# Patient Record
Sex: MALE | Race: WHITE | NOT HISPANIC OR LATINO | Employment: PART TIME | URBAN - METROPOLITAN AREA
[De-identification: names, ages, dates, MRNs, and addresses within clinical notes are randomized per-mention and may not be internally consistent; named-entity substitution may affect disease eponyms.]

---
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
## 2023-01-28 ENCOUNTER — OFFICE VISIT (OUTPATIENT)
Dept: URGENT CARE | Facility: URGENT CARE | Age: 18
End: 2023-01-28
Payer: COMMERCIAL

## 2023-01-28 VITALS
RESPIRATION RATE: 14 BRPM | DIASTOLIC BLOOD PRESSURE: 64 MMHG | SYSTOLIC BLOOD PRESSURE: 118 MMHG | TEMPERATURE: 97.9 F | HEART RATE: 72 BPM | WEIGHT: 158.9 LBS | BODY MASS INDEX: 23.98 KG/M2 | OXYGEN SATURATION: 99 %

## 2023-01-28 DIAGNOSIS — L02.31 ABSCESS, GLUTEAL, RIGHT: Primary | ICD-10-CM

## 2023-01-28 PROCEDURE — 87070 CULTURE OTHR SPECIMN AEROBIC: CPT | Performed by: FAMILY MEDICINE

## 2023-01-28 PROCEDURE — 87186 SC STD MICRODIL/AGAR DIL: CPT | Performed by: FAMILY MEDICINE

## 2023-01-28 PROCEDURE — 99213 OFFICE O/P EST LOW 20 MIN: CPT | Performed by: FAMILY MEDICINE

## 2023-01-28 PROCEDURE — 87077 CULTURE AEROBIC IDENTIFY: CPT | Performed by: FAMILY MEDICINE

## 2023-01-28 RX ORDER — CEPHALEXIN 500 MG/1
500 CAPSULE ORAL 3 TIMES DAILY
Qty: 30 CAPSULE | Refills: 0 | Status: SHIPPED | OUTPATIENT
Start: 2023-01-28 | End: 2023-02-07

## 2023-01-28 NOTE — PATIENT INSTRUCTIONS
Bath once daily with epsom salts  Follow up if not improving as anticipated.   Acetaminophen (Tylenol) 500mg tablets.  You may take 2 tabs every 4-6 hours as needed  Ibuprofen (Advil, Motrin) 200mg tablets.  You may take 3 tabs every 6-8 hours as needed with food.

## 2023-01-28 NOTE — PROGRESS NOTES
Clinical Decision Making:    At the end of the encounter, I discussed results, diagnosis, medications. Discussed red flags for immediate return to clinic/ER, as well as indications for follow up if no improvement. Patient understood and agreed to plan. Patient was stable for discharge.      ICD-10-CM    1. Abscess, gluteal, right  L02.31 cephALEXin (KEFLEX) 500 MG capsule     Cyst Aerobic Bacterial Culture Routine        Treating with cephalexin 500 mg 3 times daily for 10 days  Bath once daily with epsom salts  Follow up if not improving as anticipated.   Acetaminophen (Tylenol) 500mg tablets.  You may take 2 tabs every 4-6 hours as needed  Ibuprofen (Advil, Motrin) 200mg tablets.  You may take 3 tabs every 6-8 hours as needed with food.      There are no Patient Instructions on file for this visit.   No follow-ups on file.      chief complaint    HPI:  Hari Ayala is a 17 year old male who presents today complaining of pain at his right buttocks for a few days.  He has had a cyst in this area before but on the left side.  He has had no fevers or chills.  He has tried ibuprofen and taking a bath and he has noted a little bit of drainage.    History obtained from mother and the patient.    Problem List:  There are no relevant problems documented for this patient.      History reviewed. No pertinent past medical history.    Social History     Tobacco Use     Smoking status: Never     Smokeless tobacco: Never   Substance Use Topics     Alcohol use: Not on file       Review of systems  negative except listed in HPI    Vitals:    01/28/23 0907   BP: 118/64   BP Location: Right arm   Pulse: 72   Resp: 14   Temp: 97.9  F (36.6  C)   TempSrc: Tympanic   SpO2: 99%   Weight: 72.1 kg (158 lb 14.4 oz)       Physical Exam  Vitals noted and within normal limits  In general he is alert, oriented, and in no acute distress  Right side of gluteal cleft with a silver dollar sized area of erythema, firmness and tenderness.   There is a small amount of purulent drainage with pressure but there is not a large area of fluctuance.  Area cleaned with Betadine swabs x3  11 blade used to make a puncture wound and a scant amount of purulent drainage is obtained.  Wound cultures obtained  No packing placed

## 2023-02-01 ENCOUNTER — TELEPHONE (OUTPATIENT)
Dept: FAMILY MEDICINE | Facility: CLINIC | Age: 18
End: 2023-02-01
Payer: COMMERCIAL

## 2023-02-01 LAB
BACTERIA FLD CULT: ABNORMAL

## 2023-02-01 NOTE — TELEPHONE ENCOUNTER
----- Message from Lou Muhammad MD sent at 2/1/2023  6:57 AM CST -----  Team - please call patient with results. Patient with bacterial growth from culture - see if improving with cephalexin, If not, I will send in alternate antibiotic.

## 2023-02-08 NOTE — TELEPHONE ENCOUNTER
I called and left another VM for patient to call back, if/when patient calls back please inquire symptoms.    Encounter closed due to multiple attempts to reach patient.

## 2023-08-27 ENCOUNTER — OFFICE VISIT (OUTPATIENT)
Dept: URGENT CARE | Facility: URGENT CARE | Age: 18
End: 2023-08-27
Payer: COMMERCIAL

## 2023-08-27 VITALS
DIASTOLIC BLOOD PRESSURE: 89 MMHG | WEIGHT: 156 LBS | RESPIRATION RATE: 18 BRPM | TEMPERATURE: 97.4 F | BODY MASS INDEX: 23.55 KG/M2 | HEART RATE: 97 BPM | SYSTOLIC BLOOD PRESSURE: 129 MMHG | OXYGEN SATURATION: 97 %

## 2023-08-27 DIAGNOSIS — L05.01 PILONIDAL CYST WITH ABSCESS: Primary | ICD-10-CM

## 2023-08-27 PROCEDURE — 87077 CULTURE AEROBIC IDENTIFY: CPT | Performed by: FAMILY MEDICINE

## 2023-08-27 PROCEDURE — 99214 OFFICE O/P EST MOD 30 MIN: CPT | Performed by: FAMILY MEDICINE

## 2023-08-27 PROCEDURE — 87186 SC STD MICRODIL/AGAR DIL: CPT | Performed by: FAMILY MEDICINE

## 2023-08-27 PROCEDURE — 87070 CULTURE OTHR SPECIMN AEROBIC: CPT | Performed by: FAMILY MEDICINE

## 2023-08-27 RX ORDER — CEPHALEXIN 500 MG/1
500 CAPSULE ORAL 3 TIMES DAILY
Qty: 30 CAPSULE | Refills: 0 | Status: SHIPPED | OUTPATIENT
Start: 2023-08-27 | End: 2023-09-06

## 2023-08-27 NOTE — PROGRESS NOTES
Clinical Decision Making:    At the end of the encounter, I discussed results, diagnosis, medications. Discussed red flags for immediate return to clinic/ER, as well as indications for follow up if no improvement. Patient understood and agreed to plan. Patient was stable for discharge.      ICD-10-CM    1. Pilonidal cyst with abscess  L05.01 Abscess Aerobic Bacterial Culture Routine     Adult General Surg Referral     cephALEXin (KEFLEX) 500 MG capsule      Pilonidal cyst with abscess that is spontaneously draining  Treating with cephalexin 500 mg 3 times daily for 10 days  Recommended soaking in the bathtub at least once daily  Referral done for general surgery for pilonidal cyst.  Patient has had 3 infections in the last 2 years and may benefit from pilonidal cyst removal.  Wound culture pending        There are no Patient Instructions on file for this visit.   No follow-ups on file.      chief complaint    HPI:  Hari Ayala is a 18 year old male who presents today complaining of pain and drainage in the buttock area.  The pain is worse if he is walking or sitting.  It has been bothering him for a couple of days.  He has had abscesses in the gluteal region a couple of times before and he feels that he has caught this 1 sooner-it feels smaller than when he has had this in the past.  No fevers.  Chart review shows that he has had the same issue November 2021 and in January 2023  History obtained from chart review and the patient.    Problem List:  There are no relevant problems documented for this patient.      History reviewed. No pertinent past medical history.    Social History     Tobacco Use    Smoking status: Never    Smokeless tobacco: Never   Substance Use Topics    Alcohol use: Not on file       Review of systems  negative except listed in HPI    Vitals:    08/27/23 0955   BP: 129/89   Pulse: 97   Resp: 18   Temp: 97.4  F (36.3  C)   TempSrc: Tympanic   SpO2: 97%   Weight: 70.8 kg (156 lb)        Physical Exam  Vitals noted and within normal limits  In general he is alert, oriented, and in no acute distress  In the gluteal cleft there is purulent drainage with pressure.  There is erythema and tenderness especially on the left gluteal area.  The left side feels firm and not fluctuant.  There is a dimple at the superior aspect of the gluteal cleft.  Pressure is applied to help drain out some more purulent fluid.  Patient tolerated well although it was tender.

## 2023-08-30 ENCOUNTER — OFFICE VISIT (OUTPATIENT)
Dept: FAMILY MEDICINE | Facility: CLINIC | Age: 18
End: 2023-08-30
Payer: COMMERCIAL

## 2023-08-30 VITALS
HEART RATE: 102 BPM | SYSTOLIC BLOOD PRESSURE: 120 MMHG | OXYGEN SATURATION: 99 % | DIASTOLIC BLOOD PRESSURE: 74 MMHG | RESPIRATION RATE: 16 BRPM | TEMPERATURE: 99.2 F

## 2023-08-30 DIAGNOSIS — L02.31 ABSCESS AND CELLULITIS OF GLUTEAL REGION: Primary | ICD-10-CM

## 2023-08-30 DIAGNOSIS — L03.317 ABSCESS AND CELLULITIS OF GLUTEAL REGION: Primary | ICD-10-CM

## 2023-08-30 PROCEDURE — 99213 OFFICE O/P EST LOW 20 MIN: CPT | Mod: 25 | Performed by: PHYSICIAN ASSISTANT

## 2023-08-30 PROCEDURE — 10060 I&D ABSCESS SIMPLE/SINGLE: CPT | Performed by: PHYSICIAN ASSISTANT

## 2023-08-30 NOTE — PROGRESS NOTES
Assessment & Plan     Abscess and cellulitis of gluteal region  Pt is having increased size of abscess despite some drainage and abx.  Recommended I and D of the abscess. . Pt agreeable with plan. Consent obtained.    Area was prepped and draped in usual sterile fashion.  5 ml of 1%lidocaine with epi was used to anesthatize the area.  After adequate anesthesia, 11 blade scalple used to making a 1 cm incision. Moderate purulent discharge easily expressed.  Wound irrigated with sterile water.  Packed with 1/4 inch plain gauze.    Pt tolerated well. Light gauze dressing.   Continue with keflex awaiting culture.   Follow-up 2 days for packing removal, consider repacking.        Britney Toro PA-C  [facility]    Renan Noriega is a 18 year old male who presents to clinic today for the following health issues:  Chief Complaint   Patient presents with    pilonidal cyst recheck     Pt feels better in general but feels it has grown in size. Has been draining.      History of Present Illness       Reason for visit:  Cyst growth    He eats 0-1 servings of fruits and vegetables daily.He consumes 3 sweetened beverage(s) daily.He exercises with enough effort to increase his heart rate 9 or less minutes per day.  He exercises with enough effort to increase his heart rate 3 or less days per week.   He is taking medications regularly.      Pt was seen 4 days ago for draining pilonidal cyst with abscess.  Culture pending but preliminary staph Lugdunensis. Pt is on keflex. He states pain is improving, but he is having increased size and is now extended to the L buttock of the abscess . It does continue to drain he is doing warm soaks.  No fevers.          Review of Systems  Constitutional, HEENT, cardiovascular, pulmonary, gi and gu systems are negative, except as otherwise noted.      Objective    /74 (BP Location: Right arm, Patient Position: Sitting, Cuff Size: Adult Large)   Pulse 102    Temp 99.2  F (37.3  C) (Tympanic)   Resp 16   SpO2 99%   Physical Exam   Exam of the buttock are reveals a 3 x 3 cm area of erythema, swelling, fluctuant nodule L buttock about 2 cm from the gluteal fold.  There is surrounding erythema that extends about 1-2 cm in diameter.    There is active drainage out of the proximal gluteal cleft where there are multiple pores and some previous scarring.

## 2023-08-31 ENCOUNTER — TELEPHONE (OUTPATIENT)
Dept: URGENT CARE | Facility: URGENT CARE | Age: 18
End: 2023-08-31
Payer: COMMERCIAL

## 2023-08-31 LAB
BACTERIA ABSC ANAEROBE+AEROBE CULT: ABNORMAL
BACTERIA ABSC ANAEROBE+AEROBE CULT: ABNORMAL

## 2023-08-31 NOTE — TELEPHONE ENCOUNTER
----- Message from Lorene Schrader MD sent at 8/31/2023  9:34 AM CDT -----  Please call patient and let him know that the bacterial culture show that the cephalexin (Keflex) that he is taking will cover this infection.  Be sure to complete the full course of treatment.  Continue baths daily.  Follow up with surgical consult as we discussed in clinic and referral was placed.  Thanks!

## 2023-09-01 ENCOUNTER — TELEPHONE (OUTPATIENT)
Dept: URGENT CARE | Facility: URGENT CARE | Age: 18
End: 2023-09-01

## 2023-09-01 ENCOUNTER — OFFICE VISIT (OUTPATIENT)
Dept: FAMILY MEDICINE | Facility: CLINIC | Age: 18
End: 2023-09-01
Payer: COMMERCIAL

## 2023-09-01 VITALS
BODY MASS INDEX: 23.64 KG/M2 | HEART RATE: 78 BPM | DIASTOLIC BLOOD PRESSURE: 77 MMHG | HEIGHT: 68 IN | WEIGHT: 156 LBS | SYSTOLIC BLOOD PRESSURE: 136 MMHG

## 2023-09-01 DIAGNOSIS — L05.91 PILONIDAL CYST: Primary | ICD-10-CM

## 2023-09-01 PROCEDURE — 99024 POSTOP FOLLOW-UP VISIT: CPT | Performed by: PHYSICIAN ASSISTANT

## 2023-09-01 ASSESSMENT — ENCOUNTER SYMPTOMS
FEVER: 0
ACTIVITY CHANGE: 1
CHILLS: 0

## 2023-09-01 NOTE — RESULT ENCOUNTER NOTE
Left voicemail for patient to return call to clinic. When patient returns call, please give them below message.    Suze Joe CMA ............... 3:41 PM, 09/01/23

## 2023-09-01 NOTE — TELEPHONE ENCOUNTER
Left voicemail for patient to return call to clinic. When patient returns call, please give them below message.    Suze Joe CMA ............... 3:43 PM, 09/01/23

## 2023-09-01 NOTE — PROGRESS NOTES
"  Assessment & Plan     Pilonidal cyst  Continue soaks. Finish antibiotics. Follow-up PRN.                    MYESHA Glass  [facility]    Renan Noriega is a 18 year old, presenting for the following health issues:  RECHECK (cyst)      9/1/2023     3:23 PM   Additional Questions   Roomed by Bronwyn       Here to recheck cyst. Remove packing. Doing much better. Tolerating the antibiotics. Doing soaks. No fever or chills. Had I & D.    History of Present Illness       Reason for visit:  Cyst growth    He eats 0-1 servings of fruits and vegetables daily.He consumes 3 sweetened beverage(s) daily.He exercises with enough effort to increase his heart rate 9 or less minutes per day.  He exercises with enough effort to increase his heart rate 3 or less days per week.   He is taking medications regularly.               Review of Systems   Constitutional:  Positive for activity change. Negative for chills and fever.            Objective    /77 (BP Location: Left arm, Patient Position: Sitting, Cuff Size: Adult Regular)   Pulse 78   Ht 1.734 m (5' 8.25\")   Wt 70.8 kg (156 lb)   BMI 23.55 kg/m    Body mass index is 23.55 kg/m .  Physical Exam No erythema, minimal swelling. Packing removed by ESPERANZA TilleyS with direct observation.                        "

## 2023-11-07 DIAGNOSIS — L05.91 PILONIDAL CYST: Primary | ICD-10-CM

## 2023-11-11 ENCOUNTER — OFFICE VISIT (OUTPATIENT)
Dept: URGENT CARE | Facility: URGENT CARE | Age: 18
End: 2023-11-11
Payer: COMMERCIAL

## 2023-11-11 VITALS
RESPIRATION RATE: 16 BRPM | DIASTOLIC BLOOD PRESSURE: 64 MMHG | SYSTOLIC BLOOD PRESSURE: 109 MMHG | HEART RATE: 72 BPM | WEIGHT: 156 LBS | BODY MASS INDEX: 23.55 KG/M2 | TEMPERATURE: 98.5 F | OXYGEN SATURATION: 97 %

## 2023-11-11 DIAGNOSIS — L05.01 PILONIDAL ABSCESS: Primary | ICD-10-CM

## 2023-11-11 PROCEDURE — 10080 I&D PILONIDAL CYST SIMPLE: CPT | Performed by: PHYSICIAN ASSISTANT

## 2023-11-11 NOTE — PROGRESS NOTES
Assessment & Plan     Pilonidal abscess  Patient has a large pilonidal cyst with abscess with a fluctuant area just lateral to the draining pore.  I have recommended  incision and drainage today and patient is agreeable to this.  The area was prepped and draped in the usual sterile fashion.  4 cc of 1% lidocaine with epinephrine was used to anesthetize the area.  After adequate anesthesia a 2 cm incision was made just left lateral to the draining pore at the proximal gluteal fold and extending laterally over the area of fluctuance.  A moderate amount of bloody and purulent discharge was easily expressed.  A hemostat was used to break up any loculations.  The wound was then irrigated with sterile saline and packed with 1/2 inch sterile packing gauze.  Patient tolerated the procedure well.  A light gauze dressing was placed.  He will return in 1 or 2 days for dressing change and removal of the packing.  He will follow-up with general surgery.  Will start Augmentin, warm compresses.  Follow-up for any worsening symptoms or fevers.  - amoxicillin-clavulanate (AUGMENTIN) 875-125 MG tablet  Dispense: 20 tablet; Refill: 0  - Adult General Surg Referral       Britney Toro PA-C  [facility] [place]    Renan Noriega is a 18 year old male who presents to clinic today for the following health issues:  Chief Complaint   Patient presents with    Derm Problem     Cyst on tailbone- x 1 month aggrevated it approx 1 month go when dirt biking.  Draining   Patient has referral for general surgery     HPI    Hari is a pleasant 18-year-old male who presents to urgent care with concerns regarding draining cyst on his tailbone over the last 1 month.  He has a history of pilonidal cyst with abscess and cellulitis which was most recently drained on 8/30/2023.  He had improved from that episode however approximately 1 month ago he was dirt biking and fell landing on his buttocks with swelling pain and  ecchymosis.  He has been monitoring the area however over the last several weeks started draining on its own a bloody and purulent discharge.  He denies any fevers.  He does have a pending general surgery consult.  He has been using ibuprofen for pain control and doing some warm soaks.  His mom is an experienced clinical support staff here at the clinic and looked at the area noting a larger area of swelling that she felt likely needed drainage today.    Review of Systems  Constitutional, HEENT, cardiovascular, pulmonary, gi and gu systems are negative, except as otherwise noted.      Objective    /64   Pulse 72   Temp 98.5  F (36.9  C)   Resp 16   Wt 70.8 kg (156 lb)   SpO2 97%   BMI 23.55 kg/m    Physical Exam   Examination of the buttocks reveals actively draining pore purulent brown discharge at the proximal gluteal fold at the area of the previous incision.  Just inferior to this is some proud flesh.  There is a approximately 4 cm x 4 cm area of induration left lateral to the draining wound on the buttock proximally and mild induration but no fluctuance on the right lateral aspect of the proximal gluteal fold.  There is mild ecchymosis overlying the sacrum.

## 2023-11-13 ENCOUNTER — OFFICE VISIT (OUTPATIENT)
Dept: FAMILY MEDICINE | Facility: CLINIC | Age: 18
End: 2023-11-13
Payer: COMMERCIAL

## 2023-11-13 VITALS
WEIGHT: 160.9 LBS | TEMPERATURE: 98.3 F | RESPIRATION RATE: 16 BRPM | HEIGHT: 68 IN | OXYGEN SATURATION: 99 % | BODY MASS INDEX: 24.38 KG/M2 | DIASTOLIC BLOOD PRESSURE: 74 MMHG | SYSTOLIC BLOOD PRESSURE: 125 MMHG | HEART RATE: 77 BPM

## 2023-11-13 DIAGNOSIS — L03.317 ABSCESS AND CELLULITIS OF GLUTEAL REGION: Primary | ICD-10-CM

## 2023-11-13 DIAGNOSIS — L02.31 ABSCESS AND CELLULITIS OF GLUTEAL REGION: Primary | ICD-10-CM

## 2023-11-13 PROCEDURE — 99213 OFFICE O/P EST LOW 20 MIN: CPT | Performed by: FAMILY MEDICINE
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
## 2023-11-15 ENCOUNTER — OFFICE VISIT (OUTPATIENT)
Dept: FAMILY MEDICINE | Facility: CLINIC | Age: 18
End: 2023-11-15
Payer: COMMERCIAL

## 2023-11-15 VITALS
DIASTOLIC BLOOD PRESSURE: 78 MMHG | BODY MASS INDEX: 24.07 KG/M2 | SYSTOLIC BLOOD PRESSURE: 134 MMHG | TEMPERATURE: 98.7 F | WEIGHT: 158.8 LBS | HEART RATE: 69 BPM | RESPIRATION RATE: 16 BRPM | OXYGEN SATURATION: 98 % | HEIGHT: 68 IN

## 2023-11-15 DIAGNOSIS — L05.91 PILONIDAL CYST: Primary | ICD-10-CM

## 2023-11-15 PROCEDURE — 99213 OFFICE O/P EST LOW 20 MIN: CPT | Performed by: PHYSICIAN ASSISTANT

## 2024-06-07 ENCOUNTER — OFFICE VISIT (OUTPATIENT)
Dept: SURGERY | Facility: CLINIC | Age: 19
End: 2024-06-07
Payer: COMMERCIAL

## 2024-06-07 VITALS
BODY MASS INDEX: 24.63 KG/M2 | HEIGHT: 68 IN | DIASTOLIC BLOOD PRESSURE: 76 MMHG | SYSTOLIC BLOOD PRESSURE: 124 MMHG | WEIGHT: 162.5 LBS

## 2024-06-07 DIAGNOSIS — L05.01 PILONIDAL ABSCESS: ICD-10-CM

## 2024-06-07 PROCEDURE — 99204 OFFICE O/P NEW MOD 45 MIN: CPT | Performed by: SURGERY

## 2024-06-07 RX ORDER — ACETAMINOPHEN 325 MG/1
975 TABLET ORAL ONCE
Status: CANCELLED | OUTPATIENT
Start: 2024-06-07 | End: 2024-06-07

## 2024-06-07 NOTE — H&P (VIEW-ONLY)
"GENERAL SURGICAL CONSULTATION    I was requested by Florentin Gore to consult on this pt to evaluate them for Pilonidal Cyst    HPI:  This is a 19 year old male here today with a draining sore area over the coccyx for 3 days.  He has not had any treatment so far this time around.  He has struggled with this Veto Cyst for the last year and he has had it lanced 3 times.     Allergies:Patient has no known allergies.    No past medical history on file.    No past surgical history on file.    CURRENT MEDS:  No current outpatient medications on file.       No family history on file.  Family history is not pertinent to this patients Chief Complaint.     reports that he has never smoked. He has never been exposed to tobacco smoke. He has never used smokeless tobacco.    Review of Systems -   10 point Review of systems is negative except for; as mentioned above in HPI and PMHx    /76   Ht 1.727 m (5' 8\")   Wt 73.7 kg (162 lb 8 oz)   BMI 24.71 kg/m    Body mass index is 24.71 kg/m .    EXAM:  GENERAL: Well developed male  HEENT: EOMI, Anicteric Sclera, Moist Mucous Membranes,  In Mouth the pt does not have redness or bleeding gums  CARDIOVASCULAR: RRR w/out murmur   CHEST/LUNG: Clear to Auscultation  ABDOMEN:  Non tender to palpation, +BS  MUSCULOSKELETAL:  No deformities with good range of motion in all extremities  NEURO: He is ambulatory with good strength in both legs.  HEME/LYMPH: No Cervical Adenopathy or tenderness.     IMAGES:  None    Assessment/Plan:  Infected Pilonidal Cyst.      Open Excision  Start on Antibiotics.        Jesus Morocho MD  [facility]  [phone]   "

## 2024-06-07 NOTE — LETTER
"6/7/2024      Hari Ayala  [address] [place] [address]      Dear Colleague,    Thank you for referring your patient, Hari Ayala, to the [facility] AND [facility]IATRICS CARE [place]. Please see a copy of my visit note below.    GENERAL SURGICAL CONSULTATION    I was requested by Florentin Gore to consult on this pt to evaluate them for Pilonidal Cyst    HPI:  This is a 19 year old male here today with a draining sore area over the coccyx for 3 days.  He has not had any treatment so far this time around.  He has struggled with this Veto Cyst for the last year and he has had it lanced 3 times.     Allergies:Patient has no known allergies.    No past medical history on file.    No past surgical history on file.    CURRENT MEDS:  No current outpatient medications on file.       No family history on file.  Family history is not pertinent to this patients Chief Complaint.     reports that he has never smoked. He has never been exposed to tobacco smoke. He has never used smokeless tobacco.    Review of Systems -   10 point Review of systems is negative except for; as mentioned above in HPI and PMHx    /76   Ht 1.727 m (5' 8\")   Wt 73.7 kg (162 lb 8 oz)   BMI 24.71 kg/m    Body mass index is 24.71 kg/m .    EXAM:  GENERAL: Well developed male  HEENT: EOMI, Anicteric Sclera, Moist Mucous Membranes,  In Mouth the pt does not have redness or bleeding gums  CARDIOVASCULAR: RRR w/out murmur   CHEST/LUNG: Clear to Auscultation  ABDOMEN:  Non tender to palpation, +BS  MUSCULOSKELETAL:  No deformities with good range of motion in all extremities  NEURO: He is ambulatory with good strength in both legs.  HEME/LYMPH: No Cervical Adenopathy or tenderness.     IMAGES:  None    Assessment/Plan:  Infected Pilonidal Cyst.      Open Excision  Start on Antibiotics.        Jesus Morocho MD  [facility]  [phone]       Again, thank you for allowing me to participate in " the care of your patient.        Sincerely,        Jesus Morocho MD

## 2024-06-07 NOTE — PROGRESS NOTES
"GENERAL SURGICAL CONSULTATION    I was requested by Florentin Gore to consult on this pt to evaluate them for Pilonidal Cyst    HPI:  This is a 19 year old male here today with a draining sore area over the coccyx for 3 days.  He has not had any treatment so far this time around.  He has struggled with this Veto Cyst for the last year and he has had it lanced 3 times.     Allergies:Patient has no known allergies.    No past medical history on file.    No past surgical history on file.    CURRENT MEDS:  No current outpatient medications on file.       No family history on file.  Family history is not pertinent to this patients Chief Complaint.     reports that he has never smoked. He has never been exposed to tobacco smoke. He has never used smokeless tobacco.    Review of Systems -   10 point Review of systems is negative except for; as mentioned above in HPI and PMHx    /76   Ht 1.727 m (5' 8\")   Wt 73.7 kg (162 lb 8 oz)   BMI 24.71 kg/m    Body mass index is 24.71 kg/m .    EXAM:  GENERAL: Well developed male  HEENT: EOMI, Anicteric Sclera, Moist Mucous Membranes,  In Mouth the pt does not have redness or bleeding gums  CARDIOVASCULAR: RRR w/out murmur   CHEST/LUNG: Clear to Auscultation  ABDOMEN:  Non tender to palpation, +BS  MUSCULOSKELETAL:  No deformities with good range of motion in all extremities  NEURO: He is ambulatory with good strength in both legs.  HEME/LYMPH: No Cervical Adenopathy or tenderness.     IMAGES:  None    Assessment/Plan:  Infected Pilonidal Cyst.      Open Excision  Start on Antibiotics.        Jesus Morocho MD  [facility]  [phone]   "

## 2024-06-10 ENCOUNTER — ANESTHESIA EVENT (OUTPATIENT)
Dept: SURGERY | Facility: AMBULATORY SURGERY CENTER | Age: 19
End: 2024-06-10
Payer: COMMERCIAL

## 2024-06-10 RX ORDER — ONDANSETRON 2 MG/ML
4 INJECTION INTRAMUSCULAR; INTRAVENOUS
Status: CANCELLED | OUTPATIENT
Start: 2024-06-10

## 2024-06-10 RX ORDER — LIDOCAINE 40 MG/G
CREAM TOPICAL
Status: CANCELLED | OUTPATIENT
Start: 2024-06-10

## 2024-06-11 ENCOUNTER — HOSPITAL ENCOUNTER (OUTPATIENT)
Facility: AMBULATORY SURGERY CENTER | Age: 19
Discharge: HOME | End: 2024-06-11
Attending: SURGERY
Payer: COMMERCIAL

## 2024-06-11 ENCOUNTER — ANESTHESIA (OUTPATIENT)
Dept: SURGERY | Facility: AMBULATORY SURGERY CENTER | Age: 19
End: 2024-06-11
Payer: COMMERCIAL

## 2024-06-11 VITALS
RESPIRATION RATE: 18 BRPM | TEMPERATURE: 97.4 F | HEART RATE: 96 BPM | DIASTOLIC BLOOD PRESSURE: 50 MMHG | OXYGEN SATURATION: 98 % | SYSTOLIC BLOOD PRESSURE: 105 MMHG

## 2024-06-11 DIAGNOSIS — L05.01 PILONIDAL ABSCESS: ICD-10-CM

## 2024-06-11 PROCEDURE — 11770 EXC PILONIDAL CYST SIMPLE: CPT | Performed by: SURGERY

## 2024-06-11 RX ORDER — PROPOFOL 10 MG/ML
INJECTION, EMULSION INTRAVENOUS PRN
Status: DISCONTINUED | OUTPATIENT
Start: 2024-06-11 | End: 2024-06-11

## 2024-06-11 RX ORDER — BUPIVACAINE HYDROCHLORIDE AND EPINEPHRINE 2.5; 5 MG/ML; UG/ML
INJECTION, SOLUTION INFILTRATION; PERINEURAL PRN
Status: DISCONTINUED | OUTPATIENT
Start: 2024-06-11 | End: 2024-06-11 | Stop reason: HOSPADM

## 2024-06-11 RX ORDER — ACETAMINOPHEN 325 MG/1
975 TABLET ORAL ONCE
Status: COMPLETED | OUTPATIENT
Start: 2024-06-11 | End: 2024-06-11

## 2024-06-11 RX ORDER — CEFAZOLIN SODIUM 2 G/100ML
2 INJECTION, SOLUTION INTRAVENOUS
Status: COMPLETED | OUTPATIENT
Start: 2024-06-11 | End: 2024-06-11

## 2024-06-11 RX ORDER — CEFAZOLIN SODIUM 2 G/100ML
2 INJECTION, SOLUTION INTRAVENOUS SEE ADMIN INSTRUCTIONS
Status: DISCONTINUED | OUTPATIENT
Start: 2024-06-11 | End: 2024-06-12 | Stop reason: HOSPADM

## 2024-06-11 RX ORDER — PROPOFOL 10 MG/ML
INJECTION, EMULSION INTRAVENOUS CONTINUOUS PRN
Status: DISCONTINUED | OUTPATIENT
Start: 2024-06-11 | End: 2024-06-11

## 2024-06-11 RX ORDER — NALOXONE HYDROCHLORIDE 0.4 MG/ML
0.1 INJECTION, SOLUTION INTRAMUSCULAR; INTRAVENOUS; SUBCUTANEOUS
Status: DISCONTINUED | OUTPATIENT
Start: 2024-06-11 | End: 2024-06-12 | Stop reason: HOSPADM

## 2024-06-11 RX ORDER — DOCUSATE SODIUM 100 MG/1
100 CAPSULE, LIQUID FILLED ORAL 2 TIMES DAILY PRN
Qty: 30 CAPSULE | Refills: 0 | Status: SHIPPED | OUTPATIENT
Start: 2024-06-11 | End: 2024-09-12

## 2024-06-11 RX ORDER — DEXAMETHASONE SODIUM PHOSPHATE 4 MG/ML
4 INJECTION, SOLUTION INTRA-ARTICULAR; INTRALESIONAL; INTRAMUSCULAR; INTRAVENOUS; SOFT TISSUE
Status: DISCONTINUED | OUTPATIENT
Start: 2024-06-11 | End: 2024-06-12 | Stop reason: HOSPADM

## 2024-06-11 RX ORDER — OXYCODONE HYDROCHLORIDE 10 MG/1
10 TABLET ORAL
Status: DISCONTINUED | OUTPATIENT
Start: 2024-06-11 | End: 2024-06-12 | Stop reason: HOSPADM

## 2024-06-11 RX ORDER — IBUPROFEN 600 MG/1
600 TABLET, FILM COATED ORAL EVERY 6 HOURS PRN
Qty: 30 TABLET | Refills: 0 | Status: SHIPPED | OUTPATIENT
Start: 2024-06-11

## 2024-06-11 RX ORDER — SODIUM CHLORIDE, SODIUM LACTATE, POTASSIUM CHLORIDE, CALCIUM CHLORIDE 600; 310; 30; 20 MG/100ML; MG/100ML; MG/100ML; MG/100ML
INJECTION, SOLUTION INTRAVENOUS CONTINUOUS
Status: DISCONTINUED | OUTPATIENT
Start: 2024-06-11 | End: 2024-06-12 | Stop reason: HOSPADM

## 2024-06-11 RX ORDER — FENTANYL CITRATE 50 UG/ML
INJECTION, SOLUTION INTRAMUSCULAR; INTRAVENOUS PRN
Status: DISCONTINUED | OUTPATIENT
Start: 2024-06-11 | End: 2024-06-11

## 2024-06-11 RX ORDER — ACETAMINOPHEN 325 MG/1
650 TABLET ORAL EVERY 4 HOURS PRN
Qty: 50 TABLET | Refills: 0 | Status: SHIPPED | OUTPATIENT
Start: 2024-06-11

## 2024-06-11 RX ORDER — ACETAMINOPHEN 325 MG/1
975 TABLET ORAL ONCE
Status: DISCONTINUED | OUTPATIENT
Start: 2024-06-11 | End: 2024-06-12 | Stop reason: HOSPADM

## 2024-06-11 RX ORDER — ONDANSETRON 2 MG/ML
4 INJECTION INTRAMUSCULAR; INTRAVENOUS EVERY 30 MIN PRN
Status: DISCONTINUED | OUTPATIENT
Start: 2024-06-11 | End: 2024-06-12 | Stop reason: HOSPADM

## 2024-06-11 RX ORDER — ONDANSETRON 4 MG/1
4 TABLET, ORALLY DISINTEGRATING ORAL EVERY 30 MIN PRN
Status: DISCONTINUED | OUTPATIENT
Start: 2024-06-11 | End: 2024-06-12 | Stop reason: HOSPADM

## 2024-06-11 RX ORDER — MAGNESIUM HYDROXIDE 1200 MG/15ML
LIQUID ORAL PRN
Status: DISCONTINUED | OUTPATIENT
Start: 2024-06-11 | End: 2024-06-11 | Stop reason: HOSPADM

## 2024-06-11 RX ORDER — FENTANYL CITRATE 0.05 MG/ML
25 INJECTION, SOLUTION INTRAMUSCULAR; INTRAVENOUS
Status: DISCONTINUED | OUTPATIENT
Start: 2024-06-11 | End: 2024-06-12 | Stop reason: HOSPADM

## 2024-06-11 RX ORDER — ONDANSETRON 2 MG/ML
INJECTION INTRAMUSCULAR; INTRAVENOUS PRN
Status: DISCONTINUED | OUTPATIENT
Start: 2024-06-11 | End: 2024-06-11

## 2024-06-11 RX ORDER — HYDROCODONE BITARTRATE AND ACETAMINOPHEN 5; 325 MG/1; MG/1
1 TABLET ORAL EVERY 6 HOURS PRN
Qty: 10 TABLET | Refills: 0 | Status: SHIPPED | OUTPATIENT
Start: 2024-06-11 | End: 2024-06-14

## 2024-06-11 RX ORDER — LIDOCAINE HYDROCHLORIDE 20 MG/ML
INJECTION, SOLUTION INFILTRATION; PERINEURAL PRN
Status: DISCONTINUED | OUTPATIENT
Start: 2024-06-11 | End: 2024-06-11

## 2024-06-11 RX ORDER — LIDOCAINE 40 MG/G
CREAM TOPICAL
Status: DISCONTINUED | OUTPATIENT
Start: 2024-06-11 | End: 2024-06-12 | Stop reason: HOSPADM

## 2024-06-11 RX ORDER — OXYCODONE HYDROCHLORIDE 5 MG/1
5 TABLET ORAL
Status: DISCONTINUED | OUTPATIENT
Start: 2024-06-11 | End: 2024-06-12 | Stop reason: HOSPADM

## 2024-06-11 RX ADMIN — FENTANYL CITRATE 50 MCG: 50 INJECTION, SOLUTION INTRAMUSCULAR; INTRAVENOUS at 12:22

## 2024-06-11 RX ADMIN — CEFAZOLIN SODIUM 2 G: 2 INJECTION, SOLUTION INTRAVENOUS at 12:16

## 2024-06-11 RX ADMIN — SODIUM CHLORIDE, SODIUM LACTATE, POTASSIUM CHLORIDE, CALCIUM CHLORIDE: 600; 310; 30; 20 INJECTION, SOLUTION INTRAVENOUS at 11:39

## 2024-06-11 RX ADMIN — PROPOFOL 180 MCG/KG/MIN: 10 INJECTION, EMULSION INTRAVENOUS at 12:18

## 2024-06-11 RX ADMIN — ONDANSETRON 4 MG: 2 INJECTION INTRAMUSCULAR; INTRAVENOUS at 12:22

## 2024-06-11 RX ADMIN — LIDOCAINE HYDROCHLORIDE 25 MG: 20 INJECTION, SOLUTION INFILTRATION; PERINEURAL at 12:18

## 2024-06-11 RX ADMIN — ACETAMINOPHEN 975 MG: 325 TABLET ORAL at 11:30

## 2024-06-11 RX ADMIN — FENTANYL CITRATE 50 MCG: 50 INJECTION, SOLUTION INTRAMUSCULAR; INTRAVENOUS at 12:34

## 2024-06-11 RX ADMIN — PROPOFOL 50 MG: 10 INJECTION, EMULSION INTRAVENOUS at 12:20

## 2024-06-11 ASSESSMENT — LIFESTYLE VARIABLES: TOBACCO_USE: 0

## 2024-06-11 NOTE — ANESTHESIA PREPROCEDURE EVALUATION
"Anesthesia Pre-Procedure Evaluation    Patient: Hari Ayala   MRN: [id number] : 2005        Procedure : Procedure(s):  EXCISION, PILONIDAL CYST          History reviewed. No pertinent past medical history.   History reviewed. No pertinent surgical history.   No Known Allergies   Social History     Tobacco Use    Smoking status: Never     Passive exposure: Never    Smokeless tobacco: Never   Substance Use Topics    Alcohol use: Yes     Comment: socially      Wt Readings from Last 1 Encounters:   24 73.7 kg (162 lb 8 oz)        Anesthesia Evaluation   Pt has not had prior anesthetic         ROS/MED HX  ENT/Pulmonary:    (-) tobacco use and sleep apnea   Neurologic:  - neg neurologic ROS     Cardiovascular:  - neg cardiovascular ROS     METS/Exercise Tolerance: >4 METS    Hematologic:  - neg hematologic  ROS     Musculoskeletal:  - neg musculoskeletal ROS     GI/Hepatic:    (-) GERD   Renal/Genitourinary:  - neg Renal ROS     Endo:  - neg endo ROS     Psychiatric/Substance Use:  - neg psychiatric ROS     Infectious Disease:  - neg infectious disease ROS     Malignancy:       Other:            Physical Exam    Airway        Mallampati: IV   TM distance: > 3 FB   Neck ROM: full   Mouth opening: > 3 cm    Respiratory Devices and Support         Dental       (+) Minor Abnormalities - some fillings, tiny chips      Cardiovascular          Rhythm and rate: regular     Pulmonary           breath sounds clear to auscultation           OUTSIDE LABS:  CBC: No results found for: \"WBC\", \"HGB\", \"HCT\", \"PLT\"  BMP:   Lab Results   Component Value Date    CR 0.51 2014    GLC 78 2014     COAGS: No results found for: \"PTT\", \"INR\", \"FIBR\"  POC: No results found for: \"BGM\", \"HCG\", \"HCGS\"  HEPATIC: No results found for: \"ALBUMIN\", \"PROTTOTAL\", \"ALT\", \"AST\", \"GGT\", \"ALKPHOS\", \"BILITOTAL\", \"BILIDIRECT\", \"ALEIDA\"  OTHER: No results found for: \"PH\", \"LACT\", \"A1C\", \"KEVIN\", \"PHOS\", \"MAG\", \"LIPASE\", \"AMYLASE\", " "\"TSH\", \"T4\", \"T3\", \"CRP\", \"SED\"    Anesthesia Plan    ASA Status:  1    NPO Status:  NPO Appropriate    Anesthesia Type: MAC.   Induction: N/a.   Maintenance: TIVA.        Consents    Anesthesia Plan(s) and associated risks, benefits, and realistic alternatives discussed. Questions answered and patient/representative(s) expressed understanding.     - Discussed:     - Discussed with:  Patient            Postoperative Care    Pain management: Multi-modal analgesia.   PONV prophylaxis: Ondansetron (or other 5HT-3), Dexamethasone or Solumedrol     Comments:               Magalis Valdez MD                    "

## 2024-06-11 NOTE — ANESTHESIA CARE TRANSFER NOTE
Patient: Hari Ayala    Procedure: Procedure(s):  EXCISION, PILONIDAL CYST       Diagnosis: Pilonidal abscess [L05.01]  Diagnosis Additional Information: No value filed.    Anesthesia Type:   MAC     Note:    Oropharynx: oropharynx clear of all foreign objects and spontaneously breathing  Level of Consciousness: drowsy  Oxygen Supplementation: face mask  Level of Supplemental Oxygen (L/min / FiO2): 8  Independent Airway: airway patency satisfactory and stable  Dentition: dentition unchanged  Vital Signs Stable: post-procedure vital signs reviewed and stable  Report to RN Given: handoff report given  Patient transferred to: Phase II    Handoff Report: Identifed the Patient, Identified the Reponsible Provider, Reviewed the pertinent medical history, Discussed the surgical course, Reviewed Intra-OP anesthesia mangement and issues during anesthesia, Set expectations for post-procedure period and Allowed opportunity for questions and acknowledgement of understanding  Vitals:  Vitals Value Taken Time   BP 98/48 06/11/24 1300   Temp 97.4  F (36.3  C) 06/11/24 1300   Pulse     Resp 18 06/11/24 1300   SpO2 99 % 06/11/24 1300       Electronically Signed By: CHRISTINE Bowers CRNA  June 11, 2024  1:02 PM   Pt receive vaccine im MD office.

## 2024-06-11 NOTE — INTERVAL H&P NOTE
I have reviewed the surgical (or preoperative) H&P that is linked to this encounter, and examined the patient. There are no significant changes    Clinical Conditions Present on Arrival:  Clinically Significant Risk Factors Present on Admission                            Full Thickness Lip Wedge Repair (Flap) Text: Given the location of the defect and the proximity to free margins a full thickness wedge repair was deemed most appropriate.  Using a sterile surgical marker, the appropriate repair was drawn incorporating the defect and placing the expected incisions perpendicular to the vermilion border.  The vermilion border was also meticulously outlined to ensure appropriate reapproximation during the repair.  The area thus outlined was incised through and through with a #15 scalpel blade.  The muscularis and dermis were reaproximated with deep sutures following hemostasis. Care was taken to realign the vermilion border before proceeding with the superficial closure.  Once the vermilion was realigned the superfical and mucosal closure was finished.

## 2024-06-11 NOTE — ANESTHESIA POSTPROCEDURE EVALUATION
Patient: Hari Ayala    Procedure: Procedure(s):  EXCISION, PILONIDAL CYST       Anesthesia Type:  MAC    Note:  Disposition: Outpatient   Postop Pain Control: Uneventful            Sign Out: Well controlled pain   PONV: No   Neuro/Psych: Uneventful            Sign Out: Acceptable/Baseline neuro status   Airway/Respiratory: Uneventful            Sign Out: Acceptable/Baseline resp. status   CV/Hemodynamics: Uneventful            Sign Out: Acceptable CV status; No obvious hypovolemia; No obvious fluid overload   Other NRE: NONE   DID A NON-ROUTINE EVENT OCCUR? No           Last vitals:  Vitals Value Taken Time   /59 06/11/24 1345   Temp 97.4  F (36.3  C) 06/11/24 1300   Pulse 84 06/11/24 1353   Resp 18 06/11/24 1300   SpO2 97 % 06/11/24 1353   Vitals shown include unfiled device data.    Electronically Signed By: Magalis Valdez MD  June 11, 2024  3:12 PM

## 2024-06-11 NOTE — OP NOTE
Operative Note    Name:  Hari Ayala  Location: [facility]  Procedure Date:  6/11/2024  PCP:  Florentin Gore    Surgery Performed:  Procedure/Surgery Information   Procedure: Procedure(s):  EXCISION, PILONIDAL CYST   Surgeon(s): Surgeons and Role:     * Jesus Morocho MD - Primary   Specimens: ID Type Source Tests Collected by Time Destination   1 :  Cyst Pilonidal Cyst SURGICAL PATHOLOGY EXAM Jesus Morocho MD 6/11/2024 12:42 PM                Pre-Procedure Diagnosis:  Pilonidal abscess [L05.01]    Post-Procedure Diagnosis:    Pilonidal abscess [L05.01]    Anesthesia:  MAC     History reviewed. No pertinent past medical history.    Patient Active Problem List    Diagnosis Date Noted    Pilonidal abscess 06/07/2024     Priority: Medium       Findings:  The patient has a pilonidal abscess that contains hair overlying the coccyx.    Operative Report:    Patient is brought the operating room given MAC anesthesia turned prone the hair around the buttocks and intergluteal cleft is shaved he sterilely prepped and draped in the usual surgical fashion and a timeout process is undertaken.    The operative field is infused with quarter sent Marcaine with epinephrine.  An elliptical incision was made around the pilonidal cyst region.  This is an elliptical incision going right down the intergluteal cleft superficial to the region of the coccyx and a bit above.  The dissection was taken down to the presacral fascia.  The lesion is excised en bloc.  The operative field was addressed with electrocautery to achieve complete hemostasis.  The operative field is irrigated.  Effluent is aspirated and the wound is packed with a normal saline damp Kerlix gauze.  The wound is covered over with super sponges and mesh shorts.    Estimated Blood Loss:   5 cc       Drains:        Implants:  * No implants in log *    Complications:    None    Jesus Morocho MD     Date: 6/11/2024  Time: 1:12 PM

## 2024-06-11 NOTE — PLAN OF CARE
Patient discharged with mom. Instructions for dressing changes went over with patient and family member. Supplies given upon discharfe

## 2024-06-11 NOTE — DISCHARGE INSTRUCTIONS
If you have any questions or concerns regarding your procedure, please contact Dr. Morocho, his office number is [phone].

## 2024-06-17 DIAGNOSIS — L05.01 PILONIDAL ABSCESS: Primary | ICD-10-CM

## 2024-06-17 RX ORDER — HYDROCODONE BITARTRATE AND ACETAMINOPHEN 5; 325 MG/1; MG/1
1 TABLET ORAL EVERY 6 HOURS PRN
Qty: 10 TABLET | Refills: 0 | Status: CANCELLED | OUTPATIENT
Start: 2024-06-17

## 2024-06-17 NOTE — TELEPHONE ENCOUNTER
Patient is s/p pilonidal cyst excision on 6/11/2024.     Patient and patient's mother calling to request refill of Arrey. Patient has been taking scheduled Ibuprofen and Tylenol but still continues to have intermittent moderate amount of pain following surgery, and Norco has been helpful is reducing the amount of pain. He is otherwise doing well.       Patient has post-op visit on 6/24/2024.     Medication and pharmacy pended for review.    Alyssa WHITLOCK RN, BSN

## 2024-06-19 ENCOUNTER — TELEPHONE (OUTPATIENT)
Dept: SURGERY | Facility: CLINIC | Age: 19
End: 2024-06-19

## 2024-06-19 ENCOUNTER — OFFICE VISIT (OUTPATIENT)
Dept: SURGERY | Facility: CLINIC | Age: 19
End: 2024-06-19
Payer: COMMERCIAL

## 2024-06-19 VITALS — BODY MASS INDEX: 24.61 KG/M2 | WEIGHT: 162.4 LBS | HEIGHT: 68 IN

## 2024-06-19 DIAGNOSIS — L05.01 PILONIDAL ABSCESS: Primary | ICD-10-CM

## 2024-06-19 PROCEDURE — 99024 POSTOP FOLLOW-UP VISIT: CPT | Performed by: SURGERY

## 2024-06-19 RX ORDER — HYDROCODONE BITARTRATE AND ACETAMINOPHEN 5; 325 MG/1; MG/1
1-2 TABLET ORAL EVERY 6 HOURS PRN
Qty: 10 TABLET | Refills: 0 | Status: SHIPPED | OUTPATIENT
Start: 2024-06-19 | End: 2024-06-22

## 2024-06-19 NOTE — PROGRESS NOTES
"HPI: Pt is here for follow up of a Veto Cyst Excision.  Pain has been poorly controlled.  No difficulties with the surgical wound/wounds.  he is eating well and denies fever and chills.         Ht 1.727 m (5' 8\")   Wt 73.7 kg (162 lb 6.4 oz)   BMI 24.69 kg/m      EXAM:  GENERAL:Appears well  ABDOMEN:  Soft, +BS  SURGICAL WOUNDS:  Open PiloCyst wound is granulating in.  It fits 1 4x4 well just now.            Assessment/Plan:  Doing well after surgery but he needs some more pain medications.  He should follow up as needed.    Jesus Morocho MD ,MD  [facility]: Department of Surgery   "

## 2024-06-19 NOTE — TELEPHONE ENCOUNTER
Patient is s/p pilonidal cyst excision on 6/11/2024.     Patient's mother (CTC on file) calling on behalf of patient to report that during wound dressing changes yesterday she noted there to be more drainage and is concerned about possible infection. Patient is reporting increased pain and is having difficulty moving around to ambulate. Mother requesting visit for today 6/19 for further evaluation.     RN attempted to call patient's mother but no answer. Left message to return call.    RN also called patient to let him know that there is an appointment opening at 1100 today with Dr. Morocho. At this time will schedule visit and patient will confirm with mother if that time works or not. RN requested patient to call back within the next 30 minutes on whether time will work.    Alyssa WHITLOCK RN, BSN

## 2024-06-19 NOTE — LETTER
"6/19/2024      Hari Ayala  [address]  [place] [address]      Dear Colleague,    Thank you for referring your patient, Hari Ayala, to the [facility]ATRICS [facility] [place]. Please see a copy of my visit note below.    HPI: Pt is here for follow up of a Veto Cyst Excision.  Pain has been poorly controlled.  No difficulties with the surgical wound/wounds.  he is eating well and denies fever and chills.         Ht 1.727 m (5' 8\")   Wt 73.7 kg (162 lb 6.4 oz)   BMI 24.69 kg/m      EXAM:  GENERAL:Appears well  ABDOMEN:  Soft, +BS  SURGICAL WOUNDS:  Open PiloCyst wound is granulating in.  It fits 1 4x4 well just now.            Assessment/Plan:  Doing well after surgery but he needs some more pain medications.  He should follow up as needed.    Jesus Morocho MD ,MD  [facility]: [facility] of Surgery       Again, thank you for allowing me to participate in the care of your patient.        Sincerely,        Jesus Morocho MD  "
DC instructions

## 2024-09-12 ENCOUNTER — OFFICE VISIT (OUTPATIENT)
Dept: FAMILY MEDICINE | Facility: CLINIC | Age: 19
End: 2024-09-12
Payer: COMMERCIAL

## 2024-09-12 VITALS
OXYGEN SATURATION: 99 % | BODY MASS INDEX: 26.4 KG/M2 | HEIGHT: 68 IN | RESPIRATION RATE: 18 BRPM | TEMPERATURE: 101.2 F | DIASTOLIC BLOOD PRESSURE: 78 MMHG | SYSTOLIC BLOOD PRESSURE: 128 MMHG | HEART RATE: 118 BPM | WEIGHT: 174.2 LBS

## 2024-09-12 DIAGNOSIS — R50.9 FEVER, UNSPECIFIED FEVER CAUSE: ICD-10-CM

## 2024-09-12 DIAGNOSIS — J02.9 SORE THROAT: Primary | ICD-10-CM

## 2024-09-12 LAB
DEPRECATED S PYO AG THROAT QL EIA: NEGATIVE
GROUP A STREP BY PCR: NOT DETECTED

## 2024-09-12 PROCEDURE — 87651 STREP A DNA AMP PROBE: CPT | Performed by: NURSE PRACTITIONER

## 2024-09-12 PROCEDURE — 99213 OFFICE O/P EST LOW 20 MIN: CPT | Performed by: NURSE PRACTITIONER

## 2024-09-12 ASSESSMENT — ENCOUNTER SYMPTOMS
SORE THROAT: 1
FEVER: 1
COUGH: 1

## 2024-09-12 NOTE — PROGRESS NOTES
"  Assessment & Plan     Sore throat  Rapid strep and throat culture negative for strep throat.  Presents with sore throat, cough and fever intermittently over the last week.  After the office visit, did take a home test and was positive for COVID-19.  He is low risk for severe COVID or complications.  Paxlovid not indicated.  Today lungs are clear to auscultation without wheezing or rales.  Recommend ibuprofen or Tylenol for fever.  Salt water gargles, tea, lemon for sore throat.  - Streptococcus A Rapid Screen w/Reflex to PCR - Clinic Collect  - Group A Streptococcus PCR Throat Swab    Fever, unspecified fever cause  As above, due to Covid virus.  - Streptococcus A Rapid Screen w/Reflex to PCR - Clinic Collect  - Group A Streptococcus PCR Throat Swab          BMI  Estimated body mass index is 26.49 kg/m  as calculated from the following:    Height as of this encounter: 1.727 m (5' 8\").    Weight as of this encounter: 79 kg (174 lb 3.2 oz).             Renan Noriega is a 19 year old, presenting for the following health issues:  Pharyngitis (Sore throat x 1 week), Cough (Cough  x 1 week), and Fever (Fever off and on x 1 week)        9/12/2024     2:57 PM   Additional Questions   Roomed by ELISABETH Michael     History of Present Illness       Reason for visit:  Sore throat, cough, runny nose, and body fatigue and slight fever  Symptom onset:  3-7 days ago  Symptom intensity:  Severe  Symptom progression:  Staying the same  Had these symptoms before:  Yes  Has tried/received treatment for these symptoms:  No  What makes it worse:  Work or body stress  What makes it better:  Resting and drinking plenty of water. Taking ibuprofen and other medications   He is taking medications regularly.                 Review of Systems  Constitutional, HEENT, cardiovascular, pulmonary, gi and gu systems are negative, except as otherwise noted.      Objective    /78 (BP Location: Right arm, Patient Position: Sitting, Cuff Size: " "Adult Regular)   Pulse 118   Temp (!) 101.2  F (38.4  C) (Tympanic)   Resp 18   Ht 1.727 m (5' 8\")   Wt 79 kg (174 lb 3.2 oz)   SpO2 99%   BMI 26.49 kg/m    Body mass index is 26.49 kg/m .  Physical Exam  Constitutional:       Appearance: He is ill-appearing.   HENT:      Head: Normocephalic and atraumatic.      Right Ear: Tympanic membrane normal.      Left Ear: Tympanic membrane normal.      Nose: Congestion present.      Mouth/Throat:      Mouth: Mucous membranes are moist.      Pharynx: Posterior oropharyngeal erythema present. No oropharyngeal exudate.   Eyes:      Pupils: Pupils are equal, round, and reactive to light.   Cardiovascular:      Rate and Rhythm: Normal rate and regular rhythm.   Pulmonary:      Effort: Pulmonary effort is normal. No respiratory distress.      Breath sounds: Normal breath sounds. No wheezing, rhonchi or rales.   Musculoskeletal:      Cervical back: Neck supple.   Lymphadenopathy:      Cervical: No cervical adenopathy.   Neurological:      Mental Status: He is alert and oriented to person, place, and time.                    Signed Electronically by: CHRISTINE Mustafa CNP    "

## 2024-09-12 NOTE — PATIENT INSTRUCTIONS
Sore Throat: Care Instructions  Overview     Infection by bacteria or a virus causes most sore throats. Cigarette smoke, dry air, air pollution, allergies, and yelling can also cause a sore throat. Sore throats can be painful and annoying. Fortunately, most sore throats go away on their own. If you have a bacterial infection, your doctor may prescribe antibiotics.  Follow-up care is a key part of your treatment and safety. Be sure to make and go to all appointments, and call your doctor if you are having problems. It's also a good idea to know your test results and keep a list of the medicines you take.  How can you care for yourself at home?  If your doctor prescribed antibiotics, take them as directed. Do not stop taking them just because you feel better. You need to take the full course of antibiotics.  Gargle with warm salt water several times a day to help reduce swelling and relieve pain. Mix 1/2 teaspoon of salt in 1 cup of warm water.  Take an over-the-counter pain medicine, such as acetaminophen (Tylenol), ibuprofen (Advil, Motrin), or naproxen (Aleve). Read and follow all instructions on the label.  Be careful when taking over-the-counter cold or flu medicines and Tylenol at the same time. Many of these medicines have acetaminophen, which is Tylenol. Read the labels to make sure that you are not taking more than the recommended dose. Too much acetaminophen (Tylenol) can be harmful.  Drink plenty of fluids. Fluids may help soothe an irritated throat. Hot fluids, such as tea or soup, may help decrease throat pain.  Use over-the-counter throat lozenges to soothe pain. Regular cough drops or hard candy may also help. These should not be given to young children because of the risk of choking.  Do not smoke or allow others to smoke around you. If you need help quitting, talk to your doctor about stop-smoking programs and medicines. These can increase your chances of quitting for good.  Use a vaporizer or  "humidifier to add moisture to your bedroom. Follow the directions for cleaning the machine.  When should you call for help?   Call your doctor now or seek immediate medical care if:    You have trouble breathing.     Your sore throat gets much worse on one side.     You have new or worse trouble swallowing.     You have a new or higher fever.   Watch closely for changes in your health, and be sure to contact your doctor if you do not get better as expected.  Where can you learn more?  Go to https://www.[employer].net/patiented  Enter U420 in the search box to learn more about \"Sore Throat: Care Instructions.\"  Current as of: September 27, 2023               Content Version: 14.0    [id number] [employer].   Care instructions adapted under license by your healthcare professional. If you have questions about a medical condition or this instruction, always ask your healthcare professional. [employer] disclaims any warranty or liability for your use of this information.      "

## 2024-09-13 ENCOUNTER — TELEPHONE (OUTPATIENT)
Dept: FAMILY MEDICINE | Facility: CLINIC | Age: 19
End: 2024-09-13
Payer: COMMERCIAL

## 2024-09-13 NOTE — TELEPHONE ENCOUNTER
Spoke with patient and informed him of results. He states he took at home test this morning and was positive for COVID.     ----- Message from Laina Chen sent at 9/13/2024  9:18 AM CDT -----  Team - please call patient with results.    Please let patient know that the throat culture is negative for strep throat.  Recommend he take a home COVID-19 test.  If that is negative and his sore throat persist, could consider mono test in the clinic.  Thank you.

## 2024-11-06 ENCOUNTER — TELEPHONE (OUTPATIENT)
Dept: FAMILY MEDICINE | Facility: CLINIC | Age: 19
End: 2024-11-06

## 2024-11-06 ENCOUNTER — OFFICE VISIT (OUTPATIENT)
Dept: FAMILY MEDICINE | Facility: CLINIC | Age: 19
End: 2024-11-06
Payer: COMMERCIAL

## 2024-11-06 VITALS
RESPIRATION RATE: 16 BRPM | WEIGHT: 172.8 LBS | DIASTOLIC BLOOD PRESSURE: 63 MMHG | OXYGEN SATURATION: 99 % | BODY MASS INDEX: 26.19 KG/M2 | HEART RATE: 122 BPM | TEMPERATURE: 100.8 F | SYSTOLIC BLOOD PRESSURE: 106 MMHG | HEIGHT: 68 IN

## 2024-11-06 DIAGNOSIS — L05.01 PILONIDAL ABSCESS: Primary | ICD-10-CM

## 2024-11-06 LAB
BASOPHILS # BLD AUTO: 0 10E3/UL (ref 0–0.2)
BASOPHILS NFR BLD AUTO: 0 %
EOSINOPHIL # BLD AUTO: 0 10E3/UL (ref 0–0.7)
EOSINOPHIL NFR BLD AUTO: 0 %
ERYTHROCYTE [DISTWIDTH] IN BLOOD BY AUTOMATED COUNT: 12.9 % (ref 10–15)
HCT VFR BLD AUTO: 44.4 % (ref 40–53)
HGB BLD-MCNC: 14.3 G/DL (ref 13.3–17.7)
IMM GRANULOCYTES # BLD: 0.1 10E3/UL
IMM GRANULOCYTES NFR BLD: 0 %
LYMPHOCYTES # BLD AUTO: 1.4 10E3/UL (ref 0.8–5.3)
LYMPHOCYTES NFR BLD AUTO: 11 %
MCH RBC QN AUTO: 27.4 PG (ref 26.5–33)
MCHC RBC AUTO-ENTMCNC: 32.2 G/DL (ref 31.5–36.5)
MCV RBC AUTO: 85 FL (ref 78–100)
MONOCYTES # BLD AUTO: 0.8 10E3/UL (ref 0–1.3)
MONOCYTES NFR BLD AUTO: 6 %
NEUTROPHILS # BLD AUTO: 10.5 10E3/UL (ref 1.6–8.3)
NEUTROPHILS NFR BLD AUTO: 82 %
PLATELET # BLD AUTO: 228 10E3/UL (ref 150–450)
RBC # BLD AUTO: 5.21 10E6/UL (ref 4.4–5.9)
WBC # BLD AUTO: 12.8 10E3/UL (ref 4–11)

## 2024-11-06 PROCEDURE — 85025 COMPLETE CBC W/AUTO DIFF WBC: CPT | Mod: QW | Performed by: FAMILY MEDICINE

## 2024-11-06 PROCEDURE — 96372 THER/PROPH/DIAG INJ SC/IM: CPT | Mod: 59 | Performed by: FAMILY MEDICINE

## 2024-11-06 PROCEDURE — 87205 SMEAR GRAM STAIN: CPT | Performed by: FAMILY MEDICINE

## 2024-11-06 PROCEDURE — 99213 OFFICE O/P EST LOW 20 MIN: CPT | Mod: 25 | Performed by: FAMILY MEDICINE

## 2024-11-06 PROCEDURE — 87077 CULTURE AEROBIC IDENTIFY: CPT | Performed by: FAMILY MEDICINE

## 2024-11-06 PROCEDURE — 10060 I&D ABSCESS SIMPLE/SINGLE: CPT | Performed by: FAMILY MEDICINE

## 2024-11-06 PROCEDURE — 36415 COLL VENOUS BLD VENIPUNCTURE: CPT | Performed by: FAMILY MEDICINE

## 2024-11-06 PROCEDURE — 87070 CULTURE OTHR SPECIMN AEROBIC: CPT | Performed by: FAMILY MEDICINE

## 2024-11-06 RX ORDER — KETOROLAC TROMETHAMINE 10 MG/1
10 TABLET, FILM COATED ORAL EVERY 6 HOURS PRN
Qty: 20 TABLET | Refills: 0 | Status: SHIPPED | OUTPATIENT
Start: 2024-11-06

## 2024-11-06 RX ORDER — CLINDAMYCIN HYDROCHLORIDE 300 MG/1
300 CAPSULE ORAL 4 TIMES DAILY
Qty: 28 CAPSULE | Refills: 0 | Status: SHIPPED | OUTPATIENT
Start: 2024-11-06 | End: 2024-11-13

## 2024-11-06 RX ORDER — CEFTRIAXONE 1 G/1
1000 INJECTION, POWDER, FOR SOLUTION INTRAMUSCULAR; INTRAVENOUS ONCE
Qty: 1 EACH | Refills: 0 | Status: SHIPPED | OUTPATIENT
Start: 2024-11-06 | End: 2024-11-06

## 2024-11-06 RX ORDER — HYDROCODONE BITARTRATE AND ACETAMINOPHEN 5; 325 MG/1; MG/1
1 TABLET ORAL EVERY 6 HOURS PRN
Qty: 10 TABLET | Refills: 0 | Status: SHIPPED | OUTPATIENT
Start: 2024-11-06 | End: 2024-11-09

## 2024-11-06 RX ORDER — CEFTRIAXONE SODIUM 1 G
1 VIAL (EA) INJECTION ONCE
Status: COMPLETED | OUTPATIENT
Start: 2024-11-06 | End: 2024-11-06

## 2024-11-06 RX ADMIN — Medication 1 G: at 15:37

## 2024-11-06 ASSESSMENT — ENCOUNTER SYMPTOMS
FEVER: 1
WOUND: 1
FATIGUE: 0

## 2024-11-06 NOTE — ASSESSMENT & PLAN NOTE
The patient has evidence of a recurrent pilonidal axis, incision and drainage done today and he will be referred back to the surgeon for definitive treatment.  He did have a low-grade fever and we did do a CBC with a white blood cell count of 12.8, we gave him 1 g of Rocephin IM and I put him on clindamycin 300 mg 4 times daily for 7 days for double coverage of MRSA and MSSA before the cultures are back.  Ketorolac and small quantity of Vicodin was prescribed for pain relief.

## 2024-11-06 NOTE — TELEPHONE ENCOUNTER
Pharmacy calling asking about IV Rocephin they received.    Please advise    WILLIE Arroyo  [facility]  [phone]    [facility]   Monday  - Thursday 7 AM - 6 PM    Friday  7 AM - 5 PM     -Please call your clinic for assistance from a nurse after hours.

## 2024-11-06 NOTE — PROGRESS NOTES
"  Assessment & Plan   Problem List Items Addressed This Visit       Pilonidal abscess - Primary     The patient has evidence of a recurrent pilonidal axis, incision and drainage done today and he will be referred back to the surgeon for definitive treatment.  He did have a low-grade fever and we did do a CBC with a white blood cell count of 12.8, we gave him 1 g of Rocephin IM and I put him on clindamycin 300 mg 4 times daily for 7 days for double coverage of MRSA and MSSA before the cultures are back.  Ketorolac and small quantity of Vicodin was prescribed for pain relief.         Relevant Medications    cefTRIAXone (ROCEPHIN) 1 GM vial    clindamycin (CLEOCIN) 300 MG capsule    HYDROcodone-acetaminophen (NORCO) 5-325 MG tablet    ketorolac (TORADOL) 10 MG tablet    cefTRIAXone (ROCEPHIN) in lidocaine 1% (PF) for IM administration 1 g (Completed)    Other Relevant Orders    Adult Gen Surg  Referral    CBC with platelets and differential (Completed)             BMI  Estimated body mass index is 26.27 kg/m  as calculated from the following:    Height as of this encounter: 1.727 m (5' 8\").    Weight as of this encounter: 78.4 kg (172 lb 12.8 oz).             Renan Noriega is a 19 year old, presenting for the following health issues:  Derm Problem (Ingrown hair, left buttock )  The patient is here for an evaluation of an abscess that developed in his perianal region for the past week and getting worse.  He does have a history of pilonidal cyst that was surgically removed back on May of this year.  He indicates the previous below nasal cyst was on the opposite side where he has the abscess right now.  He reports significant pain and the inability to sit down.  He has not felt any drainage coming out.      11/6/2024     2:36 PM   Additional Questions   Roomed by ELISABETH Pruitt     HPI             Review of Systems   Constitutional:  Positive for fever. Negative for fatigue.   Skin:  Positive for wound.        " "  Objective    /63   Pulse (!) 122   Temp (!) 100.8  F (38.2  C) (Oral)   Resp 16   Ht 1.727 m (5' 8\")   Wt 78.4 kg (172 lb 12.8 oz)   SpO2 99%   BMI 26.27 kg/m    Body mass index is 26.27 kg/m .  Physical Exam   On exam the patient appears uncomfortable, unable to sit.  Examination of the perianal region reveals an area of significant induration over the left buttock area, there is fluctuance, the area of induration extends to the coccygeal area, there is no spontaneous drainage, there is no evidence of secondary cellulitis.    Procedure note:  With the consent of the patient, cleans the area with Hibiclens and anesthetized with lidocaine 1% with epinephrine 5 cc, followed by incision with scalpel #10 and with the help of straight hemostat helped evacuate the great majority of the abscess.  Purulent discharge came out and this was collected for culture.  Scant bleeding occurred.  Some areas of induration remained apparently and deeper areas of the buttock.  Cover the area with gauzes and tape.    Results for orders placed or performed in visit on 11/06/24 (from the past 24 hours)   CBC with platelets and differential    Narrative    The following orders were created for panel order CBC with platelets and differential.  Procedure                               Abnormality         Status                     ---------                               -----------         ------                     CBC with platelets and d...[[id number]]  Abnormal            Final result                 Please view results for these tests on the individual orders.   CBC with platelets and differential   Result Value Ref Range    WBC Count 12.8 (H) 4.0 - 11.0 10e3/uL    RBC Count 5.21 4.40 - 5.90 10e6/uL    Hemoglobin 14.3 13.3 - 17.7 g/dL    Hematocrit 44.4 40.0 - 53.0 %    MCV 85 78 - 100 fL    MCH 27.4 26.5 - 33.0 pg    MCHC 32.2 31.5 - 36.5 g/dL    RDW 12.9 10.0 - 15.0 %    Platelet Count 228 150 - 450 10e3/uL    % " Neutrophils 82 %    % Lymphocytes 11 %    % Monocytes 6 %    % Eosinophils 0 %    % Basophils 0 %    % Immature Granulocytes 0 %    Absolute Neutrophils 10.5 (H) 1.6 - 8.3 10e3/uL    Absolute Lymphocytes 1.4 0.8 - 5.3 10e3/uL    Absolute Monocytes 0.8 0.0 - 1.3 10e3/uL    Absolute Eosinophils 0.0 0.0 - 0.7 10e3/uL    Absolute Basophils 0.0 0.0 - 0.2 10e3/uL    Absolute Immature Granulocytes 0.1 <=0.4 10e3/uL           Signed Electronically by: Chris Ramírez MD

## 2024-11-06 NOTE — PATIENT INSTRUCTIONS
Thank you for visiting us today.    Below are a summary of my recommendations as discussed during your visit:    Your exam is consistent with a recurrent pilonidal abscess, this superficial abscess was drained today and sent for culture.  You will need to see general surgery again, referral was placed with the same surgeon that surgery for.  I am putting you on clindamycin that has good coverage for both MRSA and regular staff aureus for the time being.  The area that was just drain needs to be kept clean, gauzes and antibiotic cream can be placed in their.  I also sent a couple of pain medications in the meantime.  I am getting an additional CBC because you had temperature of 100.8 Fahrenheit today.        Don't hesitate to contact us if you have any questions    Dr Porter (Chris Ramírez MD)

## 2024-11-11 LAB
BACTERIA ABSC ANAEROBE+AEROBE CULT: ABNORMAL
BACTERIA ABSC ANAEROBE+AEROBE CULT: ABNORMAL
GRAM STAIN RESULT: ABNORMAL

## 2024-11-12 NOTE — RESULT ENCOUNTER NOTE
So far all of the results from the abscess that we drained indicate Streptococcus which is susceptible to the antibiotic prescribed, I am not sure if he has already seen the surgeon.  If possible please update me on how he is doing as I do not see any new notes from surgery since I saw him.

## 2024-11-14 ENCOUNTER — TELEPHONE (OUTPATIENT)
Dept: FAMILY MEDICINE | Facility: CLINIC | Age: 19
End: 2024-11-14
Payer: COMMERCIAL

## 2024-11-14 NOTE — TELEPHONE ENCOUNTER
VM box is full.   ----- Message from Irene MEYERS sent at 11/13/2024  7:18 AM CST -----    ----- Message -----  From: Chris Ramírez MD  Sent: 11/12/2024   5:21 PM CST  To: [facility] Pool    So far all of the results from the abscess that we drained indicate Streptococcus which is susceptible to the antibiotic prescribed, I am not sure if he has already seen the surgeon.  If possible please update me on how he is doing as I do not see any new notes from surgery since I saw him.

## 2024-11-15 NOTE — TELEPHONE ENCOUNTER
Call with pt, given results message from provider. Pt states he is doing better and has reached out to schedule with the surgeon. Pt has no questions at this time.

## 2024-12-16 ENCOUNTER — OFFICE VISIT (OUTPATIENT)
Dept: FAMILY MEDICINE | Facility: CLINIC | Age: 19
End: 2024-12-16
Payer: COMMERCIAL

## 2024-12-16 VITALS
BODY MASS INDEX: 26.9 KG/M2 | TEMPERATURE: 98.6 F | RESPIRATION RATE: 16 BRPM | HEART RATE: 100 BPM | OXYGEN SATURATION: 98 % | DIASTOLIC BLOOD PRESSURE: 70 MMHG | HEIGHT: 68 IN | WEIGHT: 177.5 LBS | SYSTOLIC BLOOD PRESSURE: 110 MMHG

## 2024-12-16 DIAGNOSIS — J02.9 SORE THROAT: Primary | ICD-10-CM

## 2024-12-16 LAB
DEPRECATED S PYO AG THROAT QL EIA: NEGATIVE
S PYO DNA THROAT QL NAA+PROBE: NOT DETECTED

## 2024-12-16 PROCEDURE — 99213 OFFICE O/P EST LOW 20 MIN: CPT | Performed by: NURSE PRACTITIONER

## 2024-12-16 PROCEDURE — 87651 STREP A DNA AMP PROBE: CPT | Performed by: NURSE PRACTITIONER

## 2024-12-16 ASSESSMENT — ENCOUNTER SYMPTOMS
COUGH: 1
SORE THROAT: 1

## 2024-12-16 NOTE — LETTER
December 17, 2024      Hari JERE Lucy  [address]  [place] [address]        Dear ,    We are writing to inform you of your test results.    Your culture was also negative.    Resulted Orders   Streptococcus A Rapid Screen w/Reflex to PCR - Clinic Collect   Result Value Ref Range    Group A Strep antigen Negative Negative   Group A Streptococcus PCR Throat Swab   Result Value Ref Range    Group A strep by PCR Not Detected Not Detected    Narrative    The Xpert Xpress Strep A test, performed on the [employer], is a rapid, qualitative in vitro diagnostic test for the detection of Streptococcus pyogenes (Group A ß-hemolytic Streptococcus, Strep A) in throat swab specimens from patients with signs and symptoms of pharyngitis. The Xpert Xpress Strep A test can be used as an aid in the diagnosis of Group A Streptococcal pharyngitis. The assay is not intended to monitor treatment for Group A Streptococcus infections. The Xpert Xpress Strep A test utilizes an automated real-time polymerase chain reaction (PCR) to detect Streptococcus pyogenes DNA.       If you have any questions or concerns, please call the clinic at the number listed above.       Sincerely,      CHRISTINE Reed CNP

## 2024-12-16 NOTE — PROGRESS NOTES
"  Assessment & Plan     Sore throat  Rapid strep and throat culture negative for strep throat.  Covid 19 home test negative.  Recommend he continue with ibuprofen and Tylenol for sore throat, salt water gargles, lozenges.  He can push fluids and rest.  Coolmist vaporizer in the bedroom may be helpful.  Follow up if symptoms do not resolve or he has new or concerning symptoms.    - Streptococcus A Rapid Screen w/Reflex to PCR - Clinic Collect  - Group A Streptococcus PCR Throat Swab          BMI  Estimated body mass index is 26.99 kg/m  as calculated from the following:    Height as of this encounter: 1.727 m (5' 8\").    Weight as of this encounter: 80.5 kg (177 lb 8 oz).             Renan Noriega is a 19 year old, presenting for the following health issues:  Pharyngitis (Sore throat x 1 week) and Cough (Cough this morning)        12/16/2024     2:55 PM   Additional Questions   Roomed by ELISABETH Michael     ST X 1 week, little better today with ibuprofen and tylenol  Cough today  Covid test negative this morning    History of Present Illness       Reason for visit:  Sore throat and runny nose  Symptom onset:  3-7 days ago  Symptoms include:  Conjestion, runny nose, cough  Symptom intensity:  Moderate  Symptom progression:  Improving  Had these symptoms before:  Yes  Has tried/received treatment for these symptoms:  Yes  Previous treatment was successful:  Yes  Prior treatment description:  Medication  What makes it worse:  Not sure  What makes it better:  Ibuprofen and Tylenol and cough drops   He is taking medications regularly.                 Review of Systems  Constitutional, HEENT, cardiovascular, pulmonary, gi and gu systems are negative, except as otherwise noted.      Objective    /70 (BP Location: Right arm, Patient Position: Sitting, Cuff Size: Adult Large)   Pulse 100   Temp 98.6  F (37  C) (Tympanic)   Resp 16   Ht 1.727 m (5' 8\")   Wt 80.5 kg (177 lb 8 oz)   SpO2 98%   BMI 26.99 kg/m    Body " mass index is 26.99 kg/m .  Physical Exam  Constitutional:       General: He is not in acute distress.     Appearance: Normal appearance. He is not ill-appearing or toxic-appearing.   HENT:      Head: Normocephalic and atraumatic.      Right Ear: Tympanic membrane normal.      Left Ear: Tympanic membrane normal.      Nose: Nose normal.      Mouth/Throat:      Mouth: Mucous membranes are moist.      Pharynx: No oropharyngeal exudate or posterior oropharyngeal erythema.   Eyes:      Pupils: Pupils are equal, round, and reactive to light.   Cardiovascular:      Rate and Rhythm: Normal rate and regular rhythm.   Pulmonary:      Effort: Pulmonary effort is normal.      Breath sounds: Normal breath sounds.   Musculoskeletal:      Cervical back: Neck supple. No tenderness.   Lymphadenopathy:      Cervical: No cervical adenopathy.   Skin:     General: Skin is warm and dry.   Neurological:      Mental Status: He is alert and oriented to person, place, and time.                    Signed Electronically by: CHRISTINE Mustafa CNP

## 2024-12-16 NOTE — PATIENT INSTRUCTIONS
Sore Throat: Care Instructions  Overview     Infection by bacteria or a virus causes most sore throats. Cigarette smoke, dry air, air pollution, allergies, and yelling can also cause a sore throat. Sore throats can be painful and annoying. Fortunately, most sore throats go away on their own. If you have a bacterial infection, your doctor may prescribe antibiotics.  Follow-up care is a key part of your treatment and safety. Be sure to make and go to all appointments, and call your doctor if you are having problems. It's also a good idea to know your test results and keep a list of the medicines you take.  How can you care for yourself at home?  If your doctor prescribed antibiotics, take them as directed. Do not stop taking them just because you feel better. You need to take the full course of antibiotics.  Gargle with warm salt water several times a day to help reduce swelling and relieve pain. Mix 1/2 teaspoon of salt in 1 cup of warm water.  Take an over-the-counter pain medicine, such as acetaminophen (Tylenol), ibuprofen (Advil, Motrin), or naproxen (Aleve). Read and follow all instructions on the label.  Be careful when taking over-the-counter cold or flu medicines and Tylenol at the same time. Many of these medicines have acetaminophen, which is Tylenol. Read the labels to make sure that you are not taking more than the recommended dose. Too much acetaminophen (Tylenol) can be harmful.  Drink plenty of fluids. Fluids may help soothe an irritated throat. Hot fluids, such as tea or soup, may help decrease throat pain.  Use over-the-counter throat lozenges to soothe pain. Regular cough drops or hard candy may also help. These should not be given to young children because of the risk of choking.  Do not smoke or allow others to smoke around you. If you need help quitting, talk to your doctor about stop-smoking programs and medicines. These can increase your chances of quitting for good.  Use a vaporizer or  "humidifier to add moisture to your bedroom. Follow the directions for cleaning the machine.  When should you call for help?   Call your doctor now or seek immediate medical care if:    You have trouble breathing.     Your sore throat gets much worse on one side.     You have new or worse trouble swallowing.     You have a new or higher fever.   Watch closely for changes in your health, and be sure to contact your doctor if you do not get better as expected.  Where can you learn more?  Go to https://www.[employer].net/patiented  Enter U420 in the search box to learn more about \"Sore Throat: Care Instructions.\"  Current as of: September 27, 2023  Content Version: 14.2 2024 [facility] [employer].   Care instructions adapted under license by your healthcare professional. If you have questions about a medical condition or this instruction, always ask your healthcare professional. [employer] disclaims any warranty or liability for your use of this information.    "

## 2025-04-21 ENCOUNTER — ANCILLARY PROCEDURE (OUTPATIENT)
Dept: GENERAL RADIOLOGY | Facility: CLINIC | Age: 20
End: 2025-04-21
Attending: PHYSICIAN ASSISTANT
Payer: COMMERCIAL

## 2025-04-21 ENCOUNTER — OFFICE VISIT (OUTPATIENT)
Dept: FAMILY MEDICINE | Facility: CLINIC | Age: 20
End: 2025-04-21
Payer: COMMERCIAL

## 2025-04-21 VITALS
BODY MASS INDEX: 28.08 KG/M2 | WEIGHT: 185.3 LBS | OXYGEN SATURATION: 98 % | RESPIRATION RATE: 16 BRPM | HEIGHT: 68 IN | HEART RATE: 98 BPM | TEMPERATURE: 98.6 F | DIASTOLIC BLOOD PRESSURE: 66 MMHG | SYSTOLIC BLOOD PRESSURE: 122 MMHG

## 2025-04-21 DIAGNOSIS — M25.561 ACUTE PAIN OF RIGHT KNEE: Primary | ICD-10-CM

## 2025-04-21 PROCEDURE — 3078F DIAST BP <80 MM HG: CPT | Performed by: PHYSICIAN ASSISTANT

## 2025-04-21 PROCEDURE — 73560 X-RAY EXAM OF KNEE 1 OR 2: CPT | Mod: TC | Performed by: RADIOLOGY

## 2025-04-21 PROCEDURE — 3074F SYST BP LT 130 MM HG: CPT | Performed by: PHYSICIAN ASSISTANT

## 2025-04-21 PROCEDURE — 1125F AMNT PAIN NOTED PAIN PRSNT: CPT | Performed by: PHYSICIAN ASSISTANT

## 2025-04-21 PROCEDURE — 99213 OFFICE O/P EST LOW 20 MIN: CPT | Performed by: PHYSICIAN ASSISTANT

## 2025-04-21 ASSESSMENT — PAIN SCALES - GENERAL: PAINLEVEL_OUTOF10: MODERATE PAIN (4)

## 2025-04-21 NOTE — PROGRESS NOTES
"Results for orders placed or performed in visit on 04/21/25   XR Knee Standing AP Bilat and Lateral Right     Status: None    Narrative    EXAM: XR KNEE STANDING AP BILAT AND LATERAL RIGHT  LOCATION: [facility]  DATE: 4/21/2025    INDICATION:  Acute pain of right knee  COMPARISON: None.      Impression    IMPRESSION:   RIGHT KNEE: No acute fracture or malalignment. There is normal joint spacing. Small knee joint effusion. Mild lateral patellar tilt and subluxation.    LEFT KNEE: No acute fracture or malalignment. There is normal joint spacing. Mild lateral patellar tilt and subluxation.       Assessment & Plan     (M25.561) Acute pain of right knee  (primary encounter diagnosis)  Comment: Appears to be MCL sprain  Plan: XR Knee Standing AP Bilat and Lateral Right        Ice elevation may wear the brace ibuprofen follow-up on an as-needed basis and certainly if not improved in 7 to 10 days or if worse prior          BMI  Estimated body mass index is 28.17 kg/m  as calculated from the following:    Height as of this encounter: 1.727 m (5' 8\").    Weight as of this encounter: 84.1 kg (185 lb 4.8 oz).             Renan Noriega is a 19 year old, presenting for the following health issues:  Knee Pain (Right knee pain after playing football yesterday )        4/21/2025     5:02 PM   Additional Questions   Roomed by IDA Moeller     Patient presents to the clinic with complaint of right knee pain  History Sunday he was participating in a family football game and he \"twisted\" his right knee  He has mild swelling  He has a brace on  At times it feels a little unstable  He has not had problems with knee pain previously  The pain is medial  As an aside he is having no follow-up problems with his pilonidal cyst    Knee Pain                        Objective    /66 (BP Location: Right arm, Patient Position: Sitting, Cuff Size: Adult Regular)   Pulse 98   Temp 98.6  F (37  C) (Tympanic)   Resp " "16    1.727 m (5' 8\")   Wt 84.1 kg (185 lb 4.8 oz)   SpO2 98%   BMI 28.17 kg/m    Body mass index is 28.17 kg/m .  Physical Exam: The patient has no effusion no erythema he has some mild tenderness over the superior aspect of the MCL there is no laxity with varus or valgus strain drawer sign is negative he has good strength flexion extension full range of motion negative Joelle's intact posterior tibial pulse              Signed Electronically by: MYESHA Glass    "